# Patient Record
Sex: FEMALE | Race: WHITE | Employment: UNEMPLOYED | URBAN - METROPOLITAN AREA
[De-identification: names, ages, dates, MRNs, and addresses within clinical notes are randomized per-mention and may not be internally consistent; named-entity substitution may affect disease eponyms.]

---

## 2018-03-14 ENCOUNTER — OFFICE VISIT (OUTPATIENT)
Dept: ORTHOPEDIC SURGERY | Age: 78
End: 2018-03-14

## 2018-03-14 VITALS
DIASTOLIC BLOOD PRESSURE: 84 MMHG | HEIGHT: 66 IN | HEART RATE: 84 BPM | SYSTOLIC BLOOD PRESSURE: 140 MMHG | BODY MASS INDEX: 29.73 KG/M2 | WEIGHT: 185 LBS

## 2018-03-14 DIAGNOSIS — S82.832A OTHER CLOSED FRACTURE OF PROXIMAL END OF LEFT FIBULA, INITIAL ENCOUNTER: ICD-10-CM

## 2018-03-14 DIAGNOSIS — M25.562 LEFT KNEE PAIN, UNSPECIFIED CHRONICITY: Primary | ICD-10-CM

## 2018-03-14 PROCEDURE — G8484 FLU IMMUNIZE NO ADMIN: HCPCS | Performed by: ORTHOPAEDIC SURGERY

## 2018-03-14 PROCEDURE — 99214 OFFICE O/P EST MOD 30 MIN: CPT | Performed by: ORTHOPAEDIC SURGERY

## 2018-03-14 PROCEDURE — G8419 CALC BMI OUT NRM PARAM NOF/U: HCPCS | Performed by: ORTHOPAEDIC SURGERY

## 2018-03-14 PROCEDURE — 1036F TOBACCO NON-USER: CPT | Performed by: ORTHOPAEDIC SURGERY

## 2018-03-14 PROCEDURE — G8400 PT W/DXA NO RESULTS DOC: HCPCS | Performed by: ORTHOPAEDIC SURGERY

## 2018-03-14 PROCEDURE — G8427 DOCREV CUR MEDS BY ELIG CLIN: HCPCS | Performed by: ORTHOPAEDIC SURGERY

## 2018-03-14 PROCEDURE — 1123F ACP DISCUSS/DSCN MKR DOCD: CPT | Performed by: ORTHOPAEDIC SURGERY

## 2018-03-14 PROCEDURE — 1090F PRES/ABSN URINE INCON ASSESS: CPT | Performed by: ORTHOPAEDIC SURGERY

## 2018-03-14 PROCEDURE — 4040F PNEUMOC VAC/ADMIN/RCVD: CPT | Performed by: ORTHOPAEDIC SURGERY

## 2018-03-14 NOTE — PROGRESS NOTES
the pertinent anatomy with handouts. We explained to her that this fracture should heal well and does not need any operative intervention. She ambulate as tolerated. She can wear her brace that makes it feel better. We will do think she would benefit from a little bit of increased frequency in her Tylenol arthritis medicine. We also did give her a diclofenac gel that she can apply to the area. She has had trouble with dizziness and narcotics in the past and so we are going to try to avoid those. If that does not work, we can have a discussion about possible low-dose of tramadol. We reviewed the plan with the patient, who verbally understands, and is electing to proceed. We will like to recheck in about 4-6 weeks to reevaluate symptoms. If symptoms are not moving in the correct direction, we will repeat exam, and consider any other advanced imaging. Patient understands there to come back sooner if they experience any new problems, or have any other concerns.       Rajan Austin MD  Board Certified Orthopaedic Surgeon  Fellow, Sports Medicine and Arthroscopic [address]   Date:    3/14/2018

## 2018-03-14 NOTE — LETTER
[place] [id number]  Phone: [phone]  Fax: [phone]    Shauna Singh MD        March 14, 2018      Voltaren: 1 finger full four times daily    Tylenol: as directed on bottle    Ice: as needed

## 2018-03-21 NOTE — PROGRESS NOTES
BREAST LUMPECTOMY Right 03/11/2002    CATARACT REMOVAL Right 04/25/2011    COLONOSCOPY  01/22/2009    FOOT SURGERY  08/11/1993    HYSTEROSCOPY  06/20/1994    MENISCECTOMY Right 08/02/1996    TONSILLECTOMY  1945    TOTAL HIP ARTHROPLASTY Right 08/11/2014     Current Outpatient Prescriptions on File Prior to Visit   Medication Sig Dispense Refill    levothyroxine (SYNTHROID) 88 MCG tablet Take 88 mcg by mouth Daily      oxybutynin (DITROPAN) 5 MG tablet Take 5 mg by mouth 3 times daily      cycloSPORINE (RESTASIS) 0.05 % ophthalmic emulsion 1 drop 2 times daily      pilocarpine (PILOCAR) 1 % ophthalmic solution 1 drop 4 times daily      EPINEPHrine (EPIPEN IJ) Inject as directed       No current facility-administered medications on file prior to visit. Social History     Social History    Marital status:      Spouse name: N/A    Number of children: N/A    Years of education: N/A     Occupational History    Not on file.      Social History Main Topics    Smoking status: Never Smoker    Smokeless tobacco: Never Used    Alcohol use No    Drug use: No    Sexual activity: Not on file     Other Topics Concern    Not on file     Social History Narrative    No narrative on file     Family History   Problem Relation Age of Onset    Arthritis Mother     Diabetes Mother     Heart Disease Mother     High Blood Pressure Mother     Diabetes Father     High Blood Pressure Father     Stroke Father     Tuberculosis Father        Current Medications:    Current Outpatient Prescriptions   Medication Sig Dispense Refill    diclofenac sodium (VOLTAREN) 1 % GEL Apply 4 g topically 4 times daily 2 Tube 3    levothyroxine (SYNTHROID) 88 MCG tablet Take 88 mcg by mouth Daily      oxybutynin (DITROPAN) 5 MG tablet Take 5 mg by mouth 3 times daily      cycloSPORINE (RESTASIS) 0.05 % ophthalmic emulsion 1 drop 2 times daily      pilocarpine (PILOCAR) 1 % ophthalmic solution 1 drop 4 times daily  EPINEPHrine (EPIPEN IJ) Inject as directed       No current facility-administered medications for this visit. Allergies: Allergies   Allergen Reactions    Alcohol Anaphylaxis and Swelling    Ampicillin Other (See Comments)     Diarrhea, swollen gums, sores, and blisters in mouth    Lamisil [Terbinafine Hcl] Rash    Tylenol With Codeine #3 [Acetaminophen-Codeine] Other (See Comments)     Extreme dizziness/vomitting    Aleve [Naproxen Sodium] Other (See Comments)     Dizzy/fuzzy feeling    Ecotrin [Aspirin] Other (See Comments)     Ringing in ears    Erythromycin Other (See Comments)     Vaginal infection     Neosporin [Neomycin-Polymyxin-Gramicidin] Hives and Swelling    Other Other (See Comments)     Darvocet - extreme dizziness  Smoke - Chest injections  Doxycyclene - Vaginal injection/mouth sores  Methyl Prednisone - Rash/hives/itching    Tetracyclines & Related Other (See Comments)     Light headed/dizzy and ringing in ears    Penicillins Diarrhea and Nausea And Vomiting    Vicodin [Hydrocodone-Acetaminophen] Nausea And Vomiting     Dizzy         Physical Exam:  Vitals:    03/14/18 1745   BP: (!) 140/84   Pulse: 84     General: Jeanna Fox is a 68y.o. year old female who is sitting comfortably in our office in no acute distress. Alert and oriented. Neuro: alert. oriented  Eyes: Extra-ocular muscles intact  Mouth: Oral mucosa moist. No perioral lesions  Pulm: Respirations unlabored and regular. Heart: Regular rate and rhythm   Skin: warm, well perfused    left Knee Exam:   She has normal anatomic alignment, she relates the cane. There are varicose veins throughout the leg that are nontender. No evidence of any previous history of scars. She is exquisitely tender to palpation about the lateral joint line and fibular head. She has full active range of motion. No tenderness palpation about the medial joint line her patella.   No significant patellofemoral crepitance on exam. the pertinent anatomy with handouts. We explained to her that this fracture should heal well and does not need any operative intervention. She ambulate as tolerated. She can wear her brace that makes it feel better. We will do think she would benefit from a little bit of increased frequency in her Tylenol arthritis medicine. We also did give her a diclofenac gel that she can apply to the area. She has had trouble with dizziness and narcotics in the past and so we are going to try to avoid those. If that does not work, we can have a discussion about possible low-dose of tramadol. We reviewed the plan with the patient, who verbally understands, and is electing to proceed. We will like to recheck in about 4-6 weeks to reevaluate symptoms. If symptoms are not moving in the correct direction, we will repeat exam, and consider any other advanced imaging. Patient understands there to come back sooner if they experience any new problems, or have any other concerns. Vinay Pina MD  Board Certified Orthopaedic Surgeon  Fellow, Sports Medicine and Arthroscopic [address] [place] [address]  Date:    3/21/2018            I supervised my sports medicine fellow in the evaluation and development of a treatment plan  for this patient. I personally interviewed the patient and performed a physical examination. In addition, I discussed the patient's condition and treatment options with them. All of their questions were answered. I personally reviewed the patient's pain scale, review of systems, family history, social history, past medical history, allergies and medications. 13 point review of systems was collected today and is filed in the medical record. Greater than 50% of the visit was spent counseling the patient.       Paola Oconnor MD  Sports Medicine, Arthroscopic Knee and Shoulder Surgery    This dictation was performed with a verbal recognition program

## 2018-04-16 ENCOUNTER — TELEPHONE (OUTPATIENT)
Dept: ORTHOPEDIC SURGERY | Age: 78
End: 2018-04-16

## 2018-04-18 ENCOUNTER — OFFICE VISIT (OUTPATIENT)
Dept: ORTHOPEDIC SURGERY | Age: 78
End: 2018-04-18

## 2018-04-18 VITALS
HEIGHT: 66 IN | DIASTOLIC BLOOD PRESSURE: 92 MMHG | WEIGHT: 180 LBS | SYSTOLIC BLOOD PRESSURE: 152 MMHG | BODY MASS INDEX: 28.93 KG/M2 | HEART RATE: 79 BPM

## 2018-04-18 DIAGNOSIS — M25.562 LEFT KNEE PAIN, UNSPECIFIED CHRONICITY: ICD-10-CM

## 2018-04-18 DIAGNOSIS — S82.832A OTHER CLOSED FRACTURE OF PROXIMAL END OF LEFT FIBULA, INITIAL ENCOUNTER: Primary | ICD-10-CM

## 2018-04-18 PROCEDURE — G8427 DOCREV CUR MEDS BY ELIG CLIN: HCPCS | Performed by: ORTHOPAEDIC SURGERY

## 2018-04-18 PROCEDURE — 1123F ACP DISCUSS/DSCN MKR DOCD: CPT | Performed by: ORTHOPAEDIC SURGERY

## 2018-04-18 PROCEDURE — 4040F PNEUMOC VAC/ADMIN/RCVD: CPT | Performed by: ORTHOPAEDIC SURGERY

## 2018-04-18 PROCEDURE — G8419 CALC BMI OUT NRM PARAM NOF/U: HCPCS | Performed by: ORTHOPAEDIC SURGERY

## 2018-04-18 PROCEDURE — 1036F TOBACCO NON-USER: CPT | Performed by: ORTHOPAEDIC SURGERY

## 2018-04-18 PROCEDURE — G8400 PT W/DXA NO RESULTS DOC: HCPCS | Performed by: ORTHOPAEDIC SURGERY

## 2018-04-18 PROCEDURE — 99213 OFFICE O/P EST LOW 20 MIN: CPT | Performed by: ORTHOPAEDIC SURGERY

## 2018-04-18 PROCEDURE — 1090F PRES/ABSN URINE INCON ASSESS: CPT | Performed by: ORTHOPAEDIC SURGERY

## 2018-06-06 ENCOUNTER — OFFICE VISIT (OUTPATIENT)
Dept: ORTHOPEDIC SURGERY | Age: 78
End: 2018-06-06

## 2018-06-06 VITALS
BODY MASS INDEX: 28.91 KG/M2 | SYSTOLIC BLOOD PRESSURE: 137 MMHG | DIASTOLIC BLOOD PRESSURE: 89 MMHG | HEART RATE: 65 BPM | HEIGHT: 66 IN | WEIGHT: 179.9 LBS

## 2018-06-06 DIAGNOSIS — S82.832D CLOSED FRACTURE OF PROXIMAL END OF LEFT FIBULA WITH ROUTINE HEALING, UNSPECIFIED FRACTURE MORPHOLOGY, SUBSEQUENT ENCOUNTER: Primary | ICD-10-CM

## 2018-06-06 DIAGNOSIS — S82.832A OTHER CLOSED FRACTURE OF PROXIMAL END OF LEFT FIBULA, INITIAL ENCOUNTER: ICD-10-CM

## 2018-06-06 PROCEDURE — G8427 DOCREV CUR MEDS BY ELIG CLIN: HCPCS | Performed by: ORTHOPAEDIC SURGERY

## 2018-06-06 PROCEDURE — 1123F ACP DISCUSS/DSCN MKR DOCD: CPT | Performed by: ORTHOPAEDIC SURGERY

## 2018-06-06 PROCEDURE — 99214 OFFICE O/P EST MOD 30 MIN: CPT | Performed by: ORTHOPAEDIC SURGERY

## 2018-06-06 PROCEDURE — G8400 PT W/DXA NO RESULTS DOC: HCPCS | Performed by: ORTHOPAEDIC SURGERY

## 2018-06-06 PROCEDURE — 1036F TOBACCO NON-USER: CPT | Performed by: ORTHOPAEDIC SURGERY

## 2018-06-06 PROCEDURE — G8419 CALC BMI OUT NRM PARAM NOF/U: HCPCS | Performed by: ORTHOPAEDIC SURGERY

## 2018-06-06 PROCEDURE — 1090F PRES/ABSN URINE INCON ASSESS: CPT | Performed by: ORTHOPAEDIC SURGERY

## 2018-06-06 PROCEDURE — 4040F PNEUMOC VAC/ADMIN/RCVD: CPT | Performed by: ORTHOPAEDIC SURGERY

## 2018-06-19 ENCOUNTER — TELEPHONE (OUTPATIENT)
Dept: ORTHOPEDIC SURGERY | Age: 78
End: 2018-06-19

## 2020-08-25 ENCOUNTER — OFFICE VISIT (OUTPATIENT)
Dept: ORTHOPEDIC SURGERY | Age: 80
End: 2020-08-25
Payer: MEDICARE

## 2020-08-25 ENCOUNTER — TELEPHONE (OUTPATIENT)
Dept: ORTHOPEDIC SURGERY | Age: 80
End: 2020-08-25

## 2020-08-25 PROCEDURE — G8400 PT W/DXA NO RESULTS DOC: HCPCS | Performed by: ORTHOPAEDIC SURGERY

## 2020-08-25 PROCEDURE — G8428 CUR MEDS NOT DOCUMENT: HCPCS | Performed by: ORTHOPAEDIC SURGERY

## 2020-08-25 PROCEDURE — 99214 OFFICE O/P EST MOD 30 MIN: CPT | Performed by: ORTHOPAEDIC SURGERY

## 2020-08-25 PROCEDURE — 1123F ACP DISCUSS/DSCN MKR DOCD: CPT | Performed by: ORTHOPAEDIC SURGERY

## 2020-08-25 PROCEDURE — L3660 SO 8 AB RSTR CAN/WEB PRE OTS: HCPCS | Performed by: ORTHOPAEDIC SURGERY

## 2020-08-25 PROCEDURE — 1090F PRES/ABSN URINE INCON ASSESS: CPT | Performed by: ORTHOPAEDIC SURGERY

## 2020-08-25 PROCEDURE — 4040F PNEUMOC VAC/ADMIN/RCVD: CPT | Performed by: ORTHOPAEDIC SURGERY

## 2020-08-25 PROCEDURE — G8421 BMI NOT CALCULATED: HCPCS | Performed by: ORTHOPAEDIC SURGERY

## 2020-08-25 PROCEDURE — 4004F PT TOBACCO SCREEN RCVD TLK: CPT | Performed by: ORTHOPAEDIC SURGERY

## 2020-08-25 NOTE — PROGRESS NOTES
Date:  2020    Name:  [facility]  Address:  [place] [address]    :  1940      Age:   [de-identified] y.o.    SSN:        Medical Record Number:  <[id number]>    Reason for Visit:    Chief Complaint    Shoulder Pain (op/np left shoulder. )      DOS:2020     HPI: [facility] is a [de-identified] y.o. female here today for evaluation of left shoulder pain that has been ongoing for 1 week. She does not recall an associated injury. She states she sleeps on her left side and woke up 1 week ago in severe pain. She complains of anterior left shoulder pain and weakness. Denies numbness and tingling. Pain is exacerbated with any movement. She has not had any treatment for this issue. Of note, she has an allergy to cortisone. ROS: Review of systems reviewed from Patient History Form completed today and available in the patient's chart under the Media tab. Past Medical History:   Diagnosis Date    Arthritis     Cataract     History of ulcer disease     Miscarriage 5    Thyroid disease         Past Surgical History:   Procedure Laterality Date    BREAST LUMPECTOMY Right 2002    CATARACT REMOVAL Right 2011    COLONOSCOPY  2009    FOOT SURGERY  1993    HYSTEROSCOPY  1994    MENISCECTOMY Right 1996    TONSILLECTOMY  194    TOTAL HIP ARTHROPLASTY Right 2014       Family History   Problem Relation Age of Onset    Arthritis Mother     Diabetes Mother     Heart Disease Mother     High Blood Pressure Mother     Diabetes Father     High Blood Pressure Father     Stroke Father     Tuberculosis Father        Social History     Socioeconomic History    Marital status:       Spouse name: Not on file    Number of children: Not on file    Years of education: Not on file    Highest education level: Not on file   Occupational History    Not on file   Social Needs    Financial resource strain: Not on file   [employer] Food insecurity     Worry: Not on file     Inability: Not on file    Transportation needs     Medical: Not on file     Non-medical: Not on file   Tobacco Use    Smoking status: Never Smoker    Smokeless tobacco: Never Used   Substance and Sexual Activity    Alcohol use: No    Drug use: No    Sexual activity: Not on file   Lifestyle    Physical activity     Days per week: Not on file     Minutes per session: Not on file    Stress: Not on file   Relationships    Social connections     Talks on phone: Not on file     Gets together: Not on file     Attends Judaism service: Not on file     Active member of club or organization: Not on file     Attends meetings of clubs or organizations: Not on file     Relationship status: Not on file    Intimate partner violence     Fear of current or ex partner: Not on file     Emotionally abused: Not on file     Physically abused: Not on file     Forced sexual activity: Not on file   Other Topics Concern    Not on file   Social History Narrative    Not on file       Current Outpatient Medications   Medication Sig Dispense Refill    diclofenac sodium (VOLTAREN) 1 % GEL Apply 4 g topically 4 times daily 2 Tube 3    levothyroxine (SYNTHROID) 88 MCG tablet Take 88 mcg by mouth Daily      oxybutynin (DITROPAN) 5 MG tablet Take 5 mg by mouth 3 times daily      cycloSPORINE (RESTASIS) 0.05 % ophthalmic emulsion 1 drop 2 times daily      pilocarpine (PILOCAR) 1 % ophthalmic solution 1 drop 4 times daily      EPINEPHrine (EPIPEN IJ) Inject as directed       No current facility-administered medications for this visit.         Allergies   Allergen Reactions    Alcohol Anaphylaxis and Swelling    Ampicillin Other (See Comments)     Diarrhea, swollen gums, sores, and blisters in mouth    Lamisil [Terbinafine] Rash    Tylenol With Codeine #3 [Acetaminophen-Codeine] Other (See Comments)     Extreme dizziness/vomitting    Aleve [Naproxen Sodium] Other (See Comments) Dizzy/fuzzy feeling    Ecotrin [Aspirin] Other (See Comments)     Ringing in ears    Erythromycin Other (See Comments)     Vaginal infection     Neosporin [Neomycin-Polymyxin-Gramicidin] Hives and Swelling    Other Other (See Comments)     Darvocet - extreme dizziness  Smoke - Chest injections  Doxycyclene - Vaginal injection/mouth sores  Methyl Prednisone - Rash/hives/itching    Tetracyclines & Related Other (See Comments)     Light headed/dizzy and ringing in ears    Penicillins Diarrhea and Nausea And Vomiting    Vicodin [Hydrocodone-Acetaminophen] Nausea And Vomiting     Dizzy         Vital signs: There were no vitals taken for this visit. Constitution: Patient cooperative with examination today. Well-developed, well-nourished in no acute distress. Neuro: Alert & oriented x 3,  no focal motor or sensory deficits noted. Eyes: sclera clear, atraumatic  Ears: Normal external ear  Mouth:  No perioral lesions  Pulm: Respirations unlabored and regular  Pulse: Extremities well-perfused. 2+ peripheral pulses   Skin: Warm, no ulcerations        LEFT Shoulder Examination:    Inspection:    Arm is held in a guarded position. No erythema or skin lesions. Palpation:     Tender of greater tuberosity. No crepitus. Acromioclavicular joint:    Nontender to palpation. Range of Motion:      Limitation of flexion as well as internal and external rotation secondary to pain. Strength:       Marked rotator cuff weakness. Stability testing:      No posterior or anterior instability     Vascular:      Skin warm well perfused. Neurologic:      Sensation intact to light touch      RIGHT Comparison Shoulder Exam:    Inspection:  Held in a normal posture. Normal contour at the acromioclavicular joint. No swelling, ecchymosis, or erythema about the shoulder. No atrophy appreciated. Palpation:  No subacromial crepitus. Range of Motion: Full passive and active ROM.  Normal scapulothoracic rhythm. Strength:  Normal supraspinatus, infraspinatus, and subscapularis muscle strength. Stability: No anterior instability. No posterior instability. Special Tests:  Crossover sign is negative. Belly press sign is negative. Lift off sign is negative. Impingement findings are negative. Labral findings are negative. Speed sign and Yergason signs are both negative. Other findings: The skin is warm dry and well perfused. 2+ radial pulse. Sensation is intact to light touch over the deltoid. Diagnostics:  Radiology:     Pertinent imaging reviewed. X-rays of the left shoulder including left Grashey, left scapular Y and left axillary views were obtained and reviewed in office:    Impression: No acute bony abnormalities appreciated on radiographic examination. Assessment: Left shoulder pain    Plan: Pertinent imaging was reviewed. The etiology, natural history, and treatment options for the disorder were discussed. The roles of activity medication, antiinflammatories, injections, bracing, physical therapy, and surgical interventions were all described to the patient and questions were answered. I believe she is a candidate for an MRI of her left shoulder to evaluate rotator cuff. She wishes to proceed with this entity. I will also give her a simple sling to use for comfort. I will see her back following the MRI to review results, sooner if needed. Nilson Howe is in agreement with this plan. All questions were answered to patient's satisfaction and was encouraged to call with any further questions.            Orders Placed This Encounter   Procedures    XR SHOULDER LEFT (MIN 2 VIEWS)     Standing Status:   Future     Number of Occurrences:   1     Standing Expiration Date:   8/25/2021     Order Specific Question:   Reason for exam:     Answer:   shoulder pain    MRI SHOULDER LEFT WO CONTRAST     Standing Status:   Future     Standing Expiration Date:   8/25/2021     Order Specific Question:   Reason for exam:     Answer:   MRI LEFT SHOULDER  EVAL FOR RCT     Order Specific Question:   Reason for exam:     Answer:   [address] Shoulder Immobilizer Sling     Patient was prescribed a [employer] Shoulder Immobilizer Sling. The left shoulder will require stabilization / immobilization from this orthosis. The orthosis will assist in protecting the affected area, provide functional support and facilitate healing. The patient was educated and fit by a healthcare professional with expert knowledge and specialization in brace application while under the direct supervision of the treating physician. Verbal and written instructions for the use of and application of this item were provided. They were instructed to contact the office immediately should the brace result in increased pain, decreased sensation, increased swelling or worsening of the condition. Susie Villafana ATC, am scribing for and in the presence of Dr. Simon Chan. 08/25/20 2:42 PM Galina Paiz ATC        I personally reviewed the patient's pain scale, review of systems, family history, social history, past medical history, allergies and medications. Review of systems was collected today, reviewed and is included in the medical record. It is available under the media tab. I personally performed the services described in this documentation and scribed by Galina Paiz ATC. Flaquito Kolb MD  Sports Medicine, Arthroscopic Knee and Shoulder Surgery    This dictation was performed with a verbal recognition program [facility]) and it was checked for errors. It is possible that there are still dictated errors within this office note. If so, please bring any errors to my attention for an addendum. All efforts were made to ensure that this office note is accurate.